# Patient Record
Sex: FEMALE | Race: WHITE | NOT HISPANIC OR LATINO | URBAN - METROPOLITAN AREA
[De-identification: names, ages, dates, MRNs, and addresses within clinical notes are randomized per-mention and may not be internally consistent; named-entity substitution may affect disease eponyms.]

---

## 2020-03-09 ENCOUNTER — APPOINTMENT (RX ONLY)
Dept: URBAN - METROPOLITAN AREA CLINIC 44 | Facility: CLINIC | Age: 25
Setting detail: DERMATOLOGY
End: 2020-03-09

## 2020-03-09 ENCOUNTER — APPOINTMENT (RX ONLY)
Dept: URBAN - METROPOLITAN AREA CLINIC 45 | Facility: CLINIC | Age: 25
Setting detail: DERMATOLOGY
End: 2020-03-09

## 2020-03-09 DIAGNOSIS — L30.1 DYSHIDROSIS [POMPHOLYX]: ICD-10-CM

## 2020-03-09 PROCEDURE — ? PHOTO-DOCUMENTATION

## 2020-03-09 PROCEDURE — ? TREATMENT REGIMEN

## 2020-03-09 PROCEDURE — ? COUNSELING

## 2020-03-09 PROCEDURE — 99202 OFFICE O/P NEW SF 15 MIN: CPT

## 2020-03-09 PROCEDURE — ? PRESCRIPTION

## 2020-03-09 RX ORDER — MOMETASONE FUROATE 1 MG/G
THIN LAYER OINTMENT TOPICAL BID
Qty: 1 | Refills: 1 | Status: ERX | COMMUNITY
Start: 2020-03-09

## 2020-03-09 RX ADMIN — MOMETASONE FUROATE THIN LAYER: 1 OINTMENT TOPICAL at 00:00

## 2020-03-09 ASSESSMENT — LOCATION SIMPLE DESCRIPTION DERM
LOCATION SIMPLE: LEFT HAND
LOCATION SIMPLE: LEFT HAND
LOCATION SIMPLE: RIGHT HAND
LOCATION SIMPLE: RIGHT HAND

## 2020-03-09 ASSESSMENT — LOCATION ZONE DERM
LOCATION ZONE: HAND
LOCATION ZONE: HAND

## 2020-03-09 ASSESSMENT — LOCATION DETAILED DESCRIPTION DERM
LOCATION DETAILED: RIGHT RADIAL DORSAL HAND
LOCATION DETAILED: LEFT RADIAL DORSAL HAND
LOCATION DETAILED: LEFT RADIAL DORSAL HAND
LOCATION DETAILED: LEFT ULNAR DORSAL HAND
LOCATION DETAILED: RIGHT RADIAL DORSAL HAND
LOCATION DETAILED: LEFT ULNAR DORSAL HAND

## 2020-03-09 NOTE — PROCEDURE: TREATMENT REGIMEN
Plan: Advised to apply Cerave moisturizer after hand washing to prevent dryness. Rec to wash hands in lukewarm water. Will revisit a stronger topical steroid if needed at next appt in 2 months.
Detail Level: Zone
Initiate Treatment: Mometasone ointment bid as needed not exceeded two weeks.
Samples Given: Cervave moisturizer
Otc Regimen: Cervave

## 2020-03-09 NOTE — PROCEDURE: TREATMENT REGIMEN
Detail Level: Zone
Otc Regimen: Cervave
Initiate Treatment: Mometasone ointment bid as needed not exceeded two weeks.
Samples Given: Cervave moisturizer
Plan: Advised to apply Cerave moisturizer after hand washing to prevent dryness. Rec to wash hands in lukewarm water. Will revisit a stronger topical steroid if needed at next appt in 2 months.

## 2020-03-09 NOTE — HPI: RASH
What Type Of Note Output Would You Prefer (Optional)?: Bullet Format
How Severe Is Your Rash?: mild
Is This A New Presentation, Or A Follow-Up?: Rash
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 She notice hand dryness increase this last past month and admits to washing her hands frequently while working. No Rx treatment has been done previously.